# Patient Record
Sex: MALE | Race: WHITE | NOT HISPANIC OR LATINO | URBAN - METROPOLITAN AREA
[De-identification: names, ages, dates, MRNs, and addresses within clinical notes are randomized per-mention and may not be internally consistent; named-entity substitution may affect disease eponyms.]

---

## 2022-12-03 ENCOUNTER — EMERGENCY (EMERGENCY)
Facility: HOSPITAL | Age: 27
LOS: 1 days | Discharge: PSYCHIATRIC OR SUBSTANCE USE FACILITY | End: 2022-12-03
Attending: EMERGENCY MEDICINE
Payer: MEDICAID

## 2022-12-03 VITALS
HEART RATE: 110 BPM | OXYGEN SATURATION: 97 % | TEMPERATURE: 98 F | SYSTOLIC BLOOD PRESSURE: 132 MMHG | RESPIRATION RATE: 16 BRPM | DIASTOLIC BLOOD PRESSURE: 79 MMHG

## 2022-12-03 DIAGNOSIS — F31.4 BIPOLAR DISORDER, CURRENT EPISODE DEPRESSED, SEVERE, WITHOUT PSYCHOTIC FEATURES: ICD-10-CM

## 2022-12-03 LAB
ALBUMIN SERPL ELPH-MCNC: 4.9 G/DL — SIGNIFICANT CHANGE UP (ref 3.3–5)
ALP SERPL-CCNC: 81 U/L — SIGNIFICANT CHANGE UP (ref 40–120)
ALT FLD-CCNC: 40 U/L — SIGNIFICANT CHANGE UP (ref 10–45)
AMPHET UR-MCNC: POSITIVE
ANION GAP SERPL CALC-SCNC: 18 MMOL/L — HIGH (ref 5–17)
ANISOCYTOSIS BLD QL: SIGNIFICANT CHANGE UP
APAP SERPL-MCNC: <15 UG/ML — SIGNIFICANT CHANGE UP (ref 10–30)
APPEARANCE UR: CLEAR — SIGNIFICANT CHANGE UP
AST SERPL-CCNC: 34 U/L — SIGNIFICANT CHANGE UP (ref 10–40)
BACTERIA # UR AUTO: NEGATIVE — SIGNIFICANT CHANGE UP
BARBITURATES UR SCN-MCNC: NEGATIVE — SIGNIFICANT CHANGE UP
BASE EXCESS BLDV CALC-SCNC: 2.5 MMOL/L — SIGNIFICANT CHANGE UP (ref -2–3)
BASOPHILS # BLD AUTO: 0 K/UL — SIGNIFICANT CHANGE UP (ref 0–0.2)
BASOPHILS NFR BLD AUTO: 0 % — SIGNIFICANT CHANGE UP (ref 0–2)
BENZODIAZ UR-MCNC: NEGATIVE — SIGNIFICANT CHANGE UP
BILIRUB SERPL-MCNC: 0.5 MG/DL — SIGNIFICANT CHANGE UP (ref 0.2–1.2)
BILIRUB UR-MCNC: NEGATIVE — SIGNIFICANT CHANGE UP
BUN SERPL-MCNC: 15 MG/DL — SIGNIFICANT CHANGE UP (ref 7–23)
CA-I SERPL-SCNC: 1.16 MMOL/L — SIGNIFICANT CHANGE UP (ref 1.15–1.33)
CALCIUM SERPL-MCNC: 9.7 MG/DL — SIGNIFICANT CHANGE UP (ref 8.4–10.5)
CHLORIDE BLDV-SCNC: 100 MMOL/L — SIGNIFICANT CHANGE UP (ref 96–108)
CHLORIDE SERPL-SCNC: 101 MMOL/L — SIGNIFICANT CHANGE UP (ref 96–108)
CO2 BLDV-SCNC: 29 MMOL/L — HIGH (ref 22–26)
CO2 SERPL-SCNC: 19 MMOL/L — LOW (ref 22–31)
COCAINE METAB.OTHER UR-MCNC: NEGATIVE — SIGNIFICANT CHANGE UP
COLOR SPEC: YELLOW — SIGNIFICANT CHANGE UP
CREAT SERPL-MCNC: 0.9 MG/DL — SIGNIFICANT CHANGE UP (ref 0.5–1.3)
DACRYOCYTES BLD QL SMEAR: SLIGHT — SIGNIFICANT CHANGE UP
DIFF PNL FLD: NEGATIVE — SIGNIFICANT CHANGE UP
EGFR: 119 ML/MIN/1.73M2 — SIGNIFICANT CHANGE UP
EOSINOPHIL # BLD AUTO: 0 K/UL — SIGNIFICANT CHANGE UP (ref 0–0.5)
EOSINOPHIL NFR BLD AUTO: 0 % — SIGNIFICANT CHANGE UP (ref 0–6)
EPI CELLS # UR: 1 /HPF — SIGNIFICANT CHANGE UP
ETHANOL SERPL-MCNC: <10 MG/DL — SIGNIFICANT CHANGE UP (ref 0–10)
FLUAV AG NPH QL: SIGNIFICANT CHANGE UP
FLUBV AG NPH QL: SIGNIFICANT CHANGE UP
GAS PNL BLDV: 137 MMOL/L — SIGNIFICANT CHANGE UP (ref 136–145)
GAS PNL BLDV: SIGNIFICANT CHANGE UP
GAS PNL BLDV: SIGNIFICANT CHANGE UP
GLUCOSE BLDV-MCNC: 120 MG/DL — HIGH (ref 70–99)
GLUCOSE SERPL-MCNC: 120 MG/DL — HIGH (ref 70–99)
GLUCOSE UR QL: NEGATIVE — SIGNIFICANT CHANGE UP
HCO3 BLDV-SCNC: 28 MMOL/L — SIGNIFICANT CHANGE UP (ref 22–29)
HCT VFR BLD CALC: 45.6 % — SIGNIFICANT CHANGE UP (ref 39–50)
HCT VFR BLDA CALC: 42 % — SIGNIFICANT CHANGE UP (ref 39–51)
HGB BLD CALC-MCNC: 14 G/DL — SIGNIFICANT CHANGE UP (ref 12.6–17.4)
HGB BLD-MCNC: 14 G/DL — SIGNIFICANT CHANGE UP (ref 13–17)
HYALINE CASTS # UR AUTO: 5 /LPF — HIGH (ref 0–2)
HYPOCHROMIA BLD QL: SLIGHT — SIGNIFICANT CHANGE UP
KETONES UR-MCNC: NEGATIVE — SIGNIFICANT CHANGE UP
LACTATE BLDV-MCNC: 1.5 MMOL/L — SIGNIFICANT CHANGE UP (ref 0.5–2)
LDH SERPL L TO P-CCNC: 209 U/L — SIGNIFICANT CHANGE UP (ref 50–242)
LEUKOCYTE ESTERASE UR-ACNC: NEGATIVE — SIGNIFICANT CHANGE UP
LYMPHOCYTES # BLD AUTO: 0.92 K/UL — LOW (ref 1–3.3)
LYMPHOCYTES # BLD AUTO: 6.1 % — LOW (ref 13–44)
MANUAL SMEAR VERIFICATION: SIGNIFICANT CHANGE UP
MCHC RBC-ENTMCNC: 18.2 PG — LOW (ref 27–34)
MCHC RBC-ENTMCNC: 30.7 GM/DL — LOW (ref 32–36)
MCV RBC AUTO: 59.2 FL — LOW (ref 80–100)
METHADONE UR-MCNC: NEGATIVE — SIGNIFICANT CHANGE UP
MICROCYTES BLD QL: SIGNIFICANT CHANGE UP
MONOCYTES # BLD AUTO: 0.92 K/UL — HIGH (ref 0–0.9)
MONOCYTES NFR BLD AUTO: 6.1 % — SIGNIFICANT CHANGE UP (ref 2–14)
NEUTROPHILS # BLD AUTO: 13.31 K/UL — HIGH (ref 1.8–7.4)
NEUTROPHILS NFR BLD AUTO: 87.8 % — HIGH (ref 43–77)
NITRITE UR-MCNC: NEGATIVE — SIGNIFICANT CHANGE UP
OPIATES UR-MCNC: NEGATIVE — SIGNIFICANT CHANGE UP
OVALOCYTES BLD QL SMEAR: SIGNIFICANT CHANGE UP
OXYCODONE UR-MCNC: NEGATIVE — SIGNIFICANT CHANGE UP
PCO2 BLDV: 45 MMHG — SIGNIFICANT CHANGE UP (ref 42–55)
PCP SPEC-MCNC: SIGNIFICANT CHANGE UP
PCP UR-MCNC: NEGATIVE — SIGNIFICANT CHANGE UP
PH BLDV: 7.4 — SIGNIFICANT CHANGE UP (ref 7.32–7.43)
PH UR: 6.5 — SIGNIFICANT CHANGE UP (ref 5–8)
PLAT MORPH BLD: NORMAL — SIGNIFICANT CHANGE UP
PLATELET # BLD AUTO: 304 K/UL — SIGNIFICANT CHANGE UP (ref 150–400)
PO2 BLDV: 40 MMHG — SIGNIFICANT CHANGE UP (ref 25–45)
POIKILOCYTOSIS BLD QL AUTO: SIGNIFICANT CHANGE UP
POLYCHROMASIA BLD QL SMEAR: SLIGHT — SIGNIFICANT CHANGE UP
POTASSIUM BLDV-SCNC: 3.3 MMOL/L — LOW (ref 3.5–5.1)
POTASSIUM SERPL-MCNC: 3.6 MMOL/L — SIGNIFICANT CHANGE UP (ref 3.5–5.3)
POTASSIUM SERPL-SCNC: 3.6 MMOL/L — SIGNIFICANT CHANGE UP (ref 3.5–5.3)
PROT SERPL-MCNC: 8.2 G/DL — SIGNIFICANT CHANGE UP (ref 6–8.3)
PROT UR-MCNC: ABNORMAL
RBC # BLD: 7.48 M/UL — HIGH (ref 4.2–5.8)
RBC # BLD: 7.7 M/UL — HIGH (ref 4.2–5.8)
RBC # FLD: 18.1 % — HIGH (ref 10.3–14.5)
RBC BLD AUTO: ABNORMAL
RBC CASTS # UR COMP ASSIST: 1 /HPF — SIGNIFICANT CHANGE UP (ref 0–4)
RETICS #: 136.9 K/UL — HIGH (ref 25–125)
RETICS/RBC NFR: 1.8 % — SIGNIFICANT CHANGE UP (ref 0.5–2.5)
RSV RNA NPH QL NAA+NON-PROBE: SIGNIFICANT CHANGE UP
SALICYLATES SERPL-MCNC: <2 MG/DL — LOW (ref 15–30)
SAO2 % BLDV: 69.4 % — SIGNIFICANT CHANGE UP (ref 67–88)
SARS-COV-2 RNA SPEC QL NAA+PROBE: SIGNIFICANT CHANGE UP
SCHISTOCYTES BLD QL AUTO: SLIGHT — SIGNIFICANT CHANGE UP
SODIUM SERPL-SCNC: 138 MMOL/L — SIGNIFICANT CHANGE UP (ref 135–145)
SP GR SPEC: 1.03 — HIGH (ref 1.01–1.02)
THC UR QL: POSITIVE
UROBILINOGEN FLD QL: NEGATIVE — SIGNIFICANT CHANGE UP
WBC # BLD: 15.16 K/UL — HIGH (ref 3.8–10.5)
WBC # FLD AUTO: 15.16 K/UL — HIGH (ref 3.8–10.5)
WBC UR QL: 2 /HPF — SIGNIFICANT CHANGE UP (ref 0–5)

## 2022-12-03 PROCEDURE — 90792 PSYCH DIAG EVAL W/MED SRVCS: CPT | Mod: 95

## 2022-12-03 PROCEDURE — 99285 EMERGENCY DEPT VISIT HI MDM: CPT

## 2022-12-03 RX ORDER — ZIPRASIDONE HYDROCHLORIDE 20 MG/1
80 CAPSULE ORAL
Refills: 0 | Status: DISCONTINUED | OUTPATIENT
Start: 2022-12-03 | End: 2022-12-04

## 2022-12-03 RX ORDER — SERTRALINE 25 MG/1
150 TABLET, FILM COATED ORAL DAILY
Refills: 0 | Status: DISCONTINUED | OUTPATIENT
Start: 2022-12-03 | End: 2022-12-07

## 2022-12-03 RX ADMIN — Medication 0.5 MILLIGRAM(S): at 23:29

## 2022-12-03 RX ADMIN — SERTRALINE 150 MILLIGRAM(S): 25 TABLET, FILM COATED ORAL at 23:29

## 2022-12-03 NOTE — ED PROVIDER NOTE - PRINCIPAL DIAGNOSIS
62 y/o F   PMHx: HTN     Pt presents to ER with c/o nausea and TNTC episode of NBNB vomiting that began ~10PM. States leading up to that she was having diffuse abd cramping and 1 episode of diarrhea. [facility] [place]. Denies sick contacts or recently travel out of country. States she feels like she may have eaten something that made her sick- street food. States now she feels generally weak and dehydrated but is no longer endorsing abd cramping or severe nausea.
Suicidal ideation

## 2022-12-03 NOTE — ED BEHAVIORAL HEALTH NOTE - BEHAVIORAL HEALTH NOTE
========================     FOR EACH COLLATERAL     ========================     Collateral below has requested that the information provided remain confidential: Yes [  ] No [ X ]     Collateral below has provided information that patient is/may be unaware of: Yes [  ] No [ X ]     Patient gives permission to obtain collateral from _____:     ( X ) Yes     (  )  No     Rationale for overriding objection               ( X ) Lack of capacity. Details: [facility] attempted to call collateral and left a voicemail.                (  ) Assessing risk of danger to self/others. Details: ________     Rationale for selecting specific collateral source               (  ) Potential to impact risk of danger to self/others and no alternative equivalent. Details: _____     NAME: Radha      NUMBER: [phone]     RELATIONSHIP: Mother      RELIABILITY: Unreliable

## 2022-12-03 NOTE — ED BEHAVIORAL HEALTH ASSESSMENT NOTE - DESCRIPTION
SOURCE: RN and secondhand ED documentation      ARRIVAL: [facility]     BELONGINGS: All belongings were provided to hospital security, and patient currently in a gown with a 1:1 staff member.     BEHAVIOR: Per RN pt has been calm and cooperative. Reports pt stated he took an extra dose of his prescribed Ativan and Adderall, denies being a suicide attempt; however does report active SI. RN stated pt expressed having a plan, and would not disclose to RN. Pt presents AOX3, with fair grooming. Denies AVH/HI. RN reports hx of SIB, denies recent engagement.      TREATMENT: none required      VISITORS: No one at bedside Denies Lives with mother and father, one older sister, high school graduate, unemployed.

## 2022-12-03 NOTE — ED PROVIDER NOTE - CLINICAL SUMMARY MEDICAL DECISION MAKING FREE TEXT BOX
27 year old male with history of long standing MDD/suicidality, prior hospitalization hx, presenting with acute on chronic suicidality, active here with plan (does not want to divulge details) but overall calm and cooperative, VS wnl stable, no evidence of acute injury on exam, no gross psychosis. Will need 1:1, psych clearance labs, telepsych consult--anticipate voluntary/involuntary admission 27 year old male with history of long standing MDD/suicidality, prior hospitalization hx, presenting with acute on chronic suicidality, active here with plan (does not want to divulge details) but overall calm and cooperative, VS wnl stable, no evidence of acute injury on exam, no gross psychosis. Will need 1:1, psych clearance labs, telepsych consult--anticipate voluntary/involuntary admission  Attending Nancy Alonso: 28 yo male h/o h/o MDD presenting with increaesed depression and SI. placed on 1:1. on exam pt with dilated pupils. nonfocal neurologic exam. calm and cooperative. blood work sent with evidence of schistocytes, on review of old charts h/o similar in the past. pt denies any history. made aware of results. will d/w psych, will need to see hematology vs pcp for follow up

## 2022-12-03 NOTE — ED BEHAVIORAL HEALTH NOTE - BEHAVIORAL HEALTH NOTE
===================     PRE-HOSPITAL COURSE     ===================     SOURCE: RN and secondhand ED documentation      DETAILS:  PT was BIB EMS activated by mom after pt took an extra dose of prescribed Ativan and Adderall.      ============     ED COURSE     ============     SOURCE: RN and secondhand ED documentation      ARRIVAL: [facility]     BELONGINGS: All belongings were provided to hospital security, and patient currently in a gown with a 1:1 staff member.     BEHAVIOR: Per RN pt has been calm and cooperative. Reports pt stated he took an extra dose of his prescribed Ativan and Adderall, denies being a suicide attempt; however does report active SI. RN stated pt expressed having a plan, and would not disclose to RN. Pt presents AOX3, with fair grooming. Denies AVH/HI. RN reports hx of SIB, denies recent engagement.      TREATMENT: none required      VISITORS: No one at bedside          ------------------------------------------------     COVID Exposure Screen- collateral (i.e. third-party, chart review, belongings, etc; include EMS and ED staff)      ---------------------------------------------------     1.    Has the patient had a COVID-19 test in the last 90 days? Unknown.      2. Has the patient tested positive for COVID-19 antibodies? Unknown.      3.Has the patient received 2 doses of the COVID-19 vaccine?  Unknown.      4. In the past 10 days, has the patient been around anyone with a positive COVID-19 test?* Unknown.      5.Has the patient been out of [place] within the past 10 days? Unknown.

## 2022-12-03 NOTE — ED ADULT NURSE NOTE - ED STAT RN HANDOFF DETAILS 3
Report given to SALAS Franklin. Receiving RN aware of PT's 2PC missing paperwork. YVETTE Ferguson awaiting callback from on-call social work.
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
## 2022-12-03 NOTE — ED BEHAVIORAL HEALTH ASSESSMENT NOTE - ADDITIONAL DETAILS ALL
Patient reports suicidal ideation with plan, though refuses to share what that plan is. Reports suicide attempt by cutting two years ago.
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
## 2022-12-03 NOTE — ED ADULT NURSE NOTE - OBJECTIVE STATEMENT
28 yo male PMH depression, A&Ox3, BIBEMS for SI.  EMS reports pt admits to taking extra ativan/adderall and has expressed suicidal ideations.  pt has had stress at home with taking care of his father/and problems with his girlfriend. Pt admits he has a plan but would not disclose with ED staff.   Breathing even and unlabored, PERRL, abdomen soft nontender, old cutting scars noted to exts.  Pt denies chest pain, palpitations, shortness of breath, headache, visual disturbances, numbness/tingling, fever, chills, diaphoresis,  nausea, vomiting, constipation, diarrhea, or urinary symptoms.

## 2022-12-03 NOTE — ED PROVIDER NOTE - PHYSICAL EXAMINATION
Gen - NAD; well-appearing; A+Ox3   HEENT - NCAT, EOMI  Neck - supple  Resp - CTAB, no increased WOB  CV -  RRR, no m/r/g  Abd - soft, NT, ND; no guarding or rebound  MSK - FROM of b/l UE and LE, no gross deformities  Extrem - no LE edema/erythema/tenderness  Neuro - no focal motor or sensation deficits  Skin - warm, well perfused; chronic appearing/healed linear hyperpigmented scars over R shin and forearm  Psych - mood "stuck", linear thoughts, somewhat strange affect, +SI, no HI, no hallucinations, good insight Gen - NAD; well-appearing; A+Ox3   HEENT - NCAT, EOMI  Neck - supple  Resp - CTAB, no increased WOB  CV -  RRR, no m/r/g  Abd - soft, NT, ND; no guarding or rebound  MSK - FROM of b/l UE and LE, no gross deformities  Extrem - no LE edema/erythema/tenderness  Neuro - no focal motor or sensation deficits  Skin - warm, well perfused; chronic appearing/healed linear hyperpigmented scars over R shin and forearm  Psych - mood "stuck", linear thoughts, somewhat strange affect, +SI, no HI, no hallucinations, good insight  Attending Nancy Alonso: Gen: NAD, heent: atrauamtic, pupils dilated bl 7mm reactive, op pink, uvula midline, neck; nttp, no nuchal rigidity, chest: nttp, no crepitus, cv: rrr, no murmurs, lungs: ctab, abd: soft, nontender, nondistended, no peritoneal signs,  no guarding, ext: wwp, neg homans, skin: no rash, neuro: awake and alert, following commands, speech clear, sensation and strength intact, no focal deficits. psych: +SI

## 2022-12-03 NOTE — ED PROVIDER NOTE - ATTENDING CONTRIBUTION TO CARE
Attending MD Nancy Alonso:  I personally have seen and examined this patient.  Resident note reviewed and agree on plan of care and except where noted.  See HPI, PE, and MDM for details.

## 2022-12-03 NOTE — ED BEHAVIORAL HEALTH ASSESSMENT NOTE - SUMMARY
Patient is a 25-year-old man, unemployed, living with mother and father in [facility], single, childless; self-reported psychiatric history bipolar disorder, anxiety, ADHD, one prior hospitalization about two years ago, prior NSSIB (hair pulling); denies past medical history; brought in by EMS activated by patient’s mother for depression after patient reportedly took “extra” medication.  Patient’s history is notable for long-standing symptoms of depression, impulsivity, self-injurious behavior. Notably, patient’s depressive symptoms appear to have been exacerbated in the setting of recent stressors. He currently reports suicidal ideation with plan and is guarded about details. He additionally reports sending messages to people in his life saying that he is going to kill himself. Patient on exam is depressed, reports suicidal ideation with plan, is impulsive (pulled out hair from chest during session). Patient requires involuntary admission to psychiatry for danger to self. We were unable to obtain collateral.    Patient pending medical clearance

## 2022-12-03 NOTE — ED PROVIDER NOTE - OBJECTIVE STATEMENT
27-year-old male with history of depression and suicidality, 1 prior psych admission at [facility] in 2019? presenting with suicidal ideation.  Patient states that he has had recent stressors secondary to his father becoming quadriplegic due to strokes as well as his girlfriend not responding to him, today took an extra dose of Ativan 0.5 mg tablet at 3 PM as well as Adderall.  Has suicidal plan but does not wish to discuss at this time.  Has history of self-injurious behavior including cutting to right shin and right forearm. No significant etoh use but +tobacco/marijuana. Crisis hotline activated by mother today when she found out patient took extra ativan.    mom [phone] 27-year-old male with history of depression, anxiety, and suicidality, 1 prior psych admission at [facility] in 2019? presenting with suicidal ideation.  Patient states that he has had recent stressors secondary to his father becoming quadriplegic due to strokes as well as his girlfriend not responding to him, today took an extra dose of Ativan 0.5 mg tablet at 3 PM as well as Adderall.  Has suicidal plan but does not wish to discuss at this time.  Has history of self-injurious behavior including cutting to right shin and right forearm. No significant etoh use but +tobacco/marijuana. Crisis hotline activated by mother today when she found out patient took extra ativan.    mom [phone]  meds: remeron, zoloft, adderall, ativan

## 2022-12-03 NOTE — ED BEHAVIORAL HEALTH ASSESSMENT NOTE - RISK ASSESSMENT
Patient is at elevated risk of self harm given history of depression, bipolar disorder, suicidal ideation and prior suicide attempts, impulsivity, social stressors. Patient with few protective factors, though include residential stability, supportive family.

## 2022-12-03 NOTE — ED BEHAVIORAL HEALTH ASSESSMENT NOTE - NSBHMSEPERCEPT_PSY_A_CORE
Benefits, risks, and possible complications of procedure explained to patient/caregiver who verbalized understanding and gave verbal consent.
No abnormalities

## 2022-12-04 LAB — HAPTOGLOB SERPL-MCNC: 146 MG/DL — SIGNIFICANT CHANGE UP (ref 34–200)

## 2022-12-04 PROCEDURE — 70450 CT HEAD/BRAIN W/O DYE: CPT | Mod: 26,MA

## 2022-12-04 RX ORDER — ZIPRASIDONE HYDROCHLORIDE 20 MG/1
80 CAPSULE ORAL ONCE
Refills: 0 | Status: COMPLETED | OUTPATIENT
Start: 2022-12-04 | End: 2022-12-04

## 2022-12-04 RX ADMIN — ZIPRASIDONE HYDROCHLORIDE 80 MILLIGRAM(S): 20 CAPSULE ORAL at 00:35

## 2022-12-04 RX ADMIN — Medication 0.5 MILLIGRAM(S): at 17:16

## 2022-12-04 RX ADMIN — Medication 0.5 MILLIGRAM(S): at 21:36

## 2022-12-04 RX ADMIN — ZIPRASIDONE HYDROCHLORIDE 80 MILLIGRAM(S): 20 CAPSULE ORAL at 21:36

## 2022-12-04 RX ADMIN — SERTRALINE 150 MILLIGRAM(S): 25 TABLET, FILM COATED ORAL at 17:11
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
## 2022-12-04 NOTE — CHART NOTE - NSCHARTNOTEFT_GEN_A_CORE
Weekend   Social Work was referred to patient in the ED as patient is recommended for inpatient psych admission. Medical chart was reviewed. As per chart patient is a 27-year-old male with history of Depression, Anxiety, and suicidality, 1 prior psych admission at [facility] in 2019. Patient presents with suicidal ideation and is requiring involuntary inpatient psychiatry admission. LMSW contacted [facility], [facility], [place], [place], [place], [facility], [facility]; no beds available. LMSW faxed paperwork to [place] for possible admission tomorrow. LMSW met with patient at bedside to provide support. Patient verbalized concerns with obtaining a bed.  made aware.   Social Work will continue to be available.

## 2022-12-05 PROCEDURE — 99214 OFFICE O/P EST MOD 30 MIN: CPT

## 2022-12-05 RX ORDER — DIPHENHYDRAMINE HCL 50 MG
25 CAPSULE ORAL ONCE
Refills: 0 | Status: COMPLETED | OUTPATIENT
Start: 2022-12-05 | End: 2022-12-05

## 2022-12-05 RX ORDER — ZIPRASIDONE HYDROCHLORIDE 20 MG/1
80 CAPSULE ORAL ONCE
Refills: 0 | Status: COMPLETED | OUTPATIENT
Start: 2022-12-05 | End: 2022-12-05

## 2022-12-05 RX ADMIN — Medication 0.5 MILLIGRAM(S): at 19:34

## 2022-12-05 RX ADMIN — SERTRALINE 150 MILLIGRAM(S): 25 TABLET, FILM COATED ORAL at 11:57

## 2022-12-05 RX ADMIN — Medication 0.5 MILLIGRAM(S): at 07:55

## 2022-12-05 NOTE — PROGRESS NOTE BEHAVIORAL HEALTH - NSBHCHARTREVIEWLAB_PSY_A_CORE FT
14.0   15.16 )-----------( 304      ( 03 Dec 2022 19:24 )             45.6     12-03    138  |  101  |  15  ----------------------------<  120<H>  3.6   |  19<L>  |  0.90    Ca    9.7      03 Dec 2022 19:24    TPro  8.2  /  Alb  4.9  /  TBili  0.5  /  DBili  x   /  AST  34  /  ALT  40  /  AlkPhos  81  12-03    Urinalysis Basic - ( 03 Dec 2022 20:20 )    Color: Yellow / Appearance: Clear / S.026 / pH: x  Gluc: x / Ketone: Negative  / Bili: Negative / Urobili: Negative   Blood: x / Protein: 30 mg/dL / Nitrite: Negative   Leuk Esterase: Negative / RBC: 1 /hpf / WBC 2 /HPF   Sq Epi: x / Non Sq Epi: 1 /hpf / Bacteria: Negative

## 2022-12-05 NOTE — PROGRESS NOTE BEHAVIORAL HEALTH - RISK ASSESSMENT
Acutely elevated in s/o recent SA, and ongoing current active SI.  Requires psychiatric admission for safety and stabilization.

## 2022-12-05 NOTE — PROGRESS NOTE BEHAVIORAL HEALTH - NSBHCHARTREVIEWINVESTIGATE_PSY_A_CORE FT
< from: 12 Lead ECG (12.03.22 @ 21:37) >      Ventricular Rate 99 BPM    Atrial Rate 99 BPM    P-R Interval 124 ms    QRS Duration 80 ms    Q-T Interval 344 ms    QTC Calculation(Bazett) 441 ms    P Axis 43 degrees    R Axis 47 degrees    T Axis 15 degrees    Diagnosis Line NORMAL SINUS RHYTHM  NONSPECIFIC ST AND T WAVE ABNORMALITY  ABNORMAL ECG  NO PREVIOUS ECGS AVAILABLE  Confirmed by WILBERT MONTEJO MD ([id number]) on 12/4/2022 4:13:10 PM    < end of copied text >

## 2022-12-05 NOTE — ED BEHAVIORAL HEALTH NOTE - BEHAVIORAL HEALTH NOTE
Re-assessment Note     ================     SOURCE:  Secondhand ED documentation.     BEHAVIOR Per chart, earlier in the day pt became agitated after a phone call with his mother. Pt began banging his head against the wall; evaluated and no visible injuries noted. Since the incident pt has been calm, cooperative and in behavioral control. Pt has been resting comfortably.     TREATMENT:  Pt was given Ativan .5mg, Zoloft 150 mg and Geodon 80 mg.

## 2022-12-05 NOTE — PROGRESS NOTE BEHAVIORAL HEALTH - NSBHCONSULTMEDS_PSY_A_CORE FT
Increase Geodon to 40mg PO daily and 80mg PO qHS    C/w home dose Ativan, Zoloft    Add Seroquel 100mg PO qhS PRN insomnia    monitor EKG for QTc<500    C-L psych will continue to follow    2PC to inpt psych once bed found and pt medically cleared

## 2022-12-05 NOTE — PROGRESS NOTE BEHAVIORAL HEALTH - NSBHCHARTREVIEWVS_PSY_A_CORE FT
T(C): 36.8 (12-05-22 @ 06:15), Max: 37.1 (12-04-22 @ 19:00)  HR: 76 (12-05-22 @ 12:42) (76 - 110)  BP: 125/86 (12-05-22 @ 12:42) (119/83 - 157/93)  RR: 18 (12-05-22 @ 12:42) (16 - 18)  SpO2: 99% (12-05-22 @ 12:42) (96% - 99%)  Wt(kg): --
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
## 2022-12-05 NOTE — PROGRESS NOTE BEHAVIORAL HEALTH - SUMMARY
Patient is a 25-year-old man, unemployed, living with mother and father in [facility], single, childless; self-reported psychiatric history bipolar disorder, anxiety, ADHD, one prior hospitalization about two years ago, prior NSSIB (hair pulling); denies past medical history; brought in by EMS activated by patient’s mother for depression after patient reportedly took “extra” medication.  Patient’s history is notable for long-standing symptoms of depression, impulsivity, self-injurious behavior. Notably, patient’s depressive symptoms appear to have been exacerbated in the setting of recent stressors. He currently reports suicidal ideation with plan and is guarded about details. He additionally reports sending messages to people in his life saying that he is going to kill himself. Patient on exam is depressed, reports suicidal ideation with plan, is impulsive (pulled out hair from chest during session). Patient requires involuntary admission to psychiatry for danger to self once medically cleared and bed identified.

## 2022-12-06 VITALS
TEMPERATURE: 98 F | OXYGEN SATURATION: 98 % | HEART RATE: 95 BPM | SYSTOLIC BLOOD PRESSURE: 128 MMHG | RESPIRATION RATE: 18 BRPM | DIASTOLIC BLOOD PRESSURE: 90 MMHG

## 2022-12-06 PROCEDURE — 82565 ASSAY OF CREATININE: CPT

## 2022-12-06 PROCEDURE — 85014 HEMATOCRIT: CPT

## 2022-12-06 PROCEDURE — 99285 EMERGENCY DEPT VISIT HI MDM: CPT | Mod: 25

## 2022-12-06 PROCEDURE — 82435 ASSAY OF BLOOD CHLORIDE: CPT

## 2022-12-06 PROCEDURE — 70450 CT HEAD/BRAIN W/O DYE: CPT | Mod: MA

## 2022-12-06 PROCEDURE — 83010 ASSAY OF HAPTOGLOBIN QUANT: CPT

## 2022-12-06 PROCEDURE — 85045 AUTOMATED RETICULOCYTE COUNT: CPT

## 2022-12-06 PROCEDURE — 82803 BLOOD GASES ANY COMBINATION: CPT

## 2022-12-06 PROCEDURE — 83605 ASSAY OF LACTIC ACID: CPT

## 2022-12-06 PROCEDURE — 82330 ASSAY OF CALCIUM: CPT

## 2022-12-06 PROCEDURE — 80307 DRUG TEST PRSMV CHEM ANLYZR: CPT

## 2022-12-06 PROCEDURE — 99214 OFFICE O/P EST MOD 30 MIN: CPT

## 2022-12-06 PROCEDURE — 80053 COMPREHEN METABOLIC PANEL: CPT

## 2022-12-06 PROCEDURE — 87637 SARSCOV2&INF A&B&RSV AMP PRB: CPT

## 2022-12-06 PROCEDURE — 83615 LACTATE (LD) (LDH) ENZYME: CPT

## 2022-12-06 PROCEDURE — 93005 ELECTROCARDIOGRAM TRACING: CPT

## 2022-12-06 PROCEDURE — 85025 COMPLETE CBC W/AUTO DIFF WBC: CPT

## 2022-12-06 PROCEDURE — 36415 COLL VENOUS BLD VENIPUNCTURE: CPT

## 2022-12-06 PROCEDURE — 85018 HEMOGLOBIN: CPT

## 2022-12-06 PROCEDURE — 82947 ASSAY GLUCOSE BLOOD QUANT: CPT

## 2022-12-06 PROCEDURE — 84132 ASSAY OF SERUM POTASSIUM: CPT

## 2022-12-06 PROCEDURE — 84295 ASSAY OF SERUM SODIUM: CPT

## 2022-12-06 PROCEDURE — 81001 URINALYSIS AUTO W/SCOPE: CPT

## 2022-12-06 RX ORDER — QUETIAPINE FUMARATE 200 MG/1
100 TABLET, FILM COATED ORAL ONCE
Refills: 0 | Status: COMPLETED | OUTPATIENT
Start: 2022-12-06 | End: 2022-12-06

## 2022-12-06 RX ORDER — QUETIAPINE FUMARATE 200 MG/1
100 TABLET, FILM COATED ORAL ONCE
Refills: 0 | Status: DISCONTINUED | OUTPATIENT
Start: 2022-12-06 | End: 2022-12-06

## 2022-12-06 RX ORDER — ZIPRASIDONE HYDROCHLORIDE 20 MG/1
40 CAPSULE ORAL
Refills: 0 | Status: DISCONTINUED | OUTPATIENT
Start: 2022-12-06 | End: 2022-12-07

## 2022-12-06 RX ORDER — ZIPRASIDONE HYDROCHLORIDE 20 MG/1
80 CAPSULE ORAL
Refills: 0 | Status: DISCONTINUED | OUTPATIENT
Start: 2022-12-06 | End: 2022-12-07

## 2022-12-06 RX ADMIN — Medication 25 MILLIGRAM(S): at 00:01

## 2022-12-06 RX ADMIN — Medication 0.5 MILLIGRAM(S): at 06:01

## 2022-12-06 RX ADMIN — Medication 0.5 MILLIGRAM(S): at 00:01

## 2022-12-06 RX ADMIN — SERTRALINE 150 MILLIGRAM(S): 25 TABLET, FILM COATED ORAL at 12:40

## 2022-12-06 RX ADMIN — QUETIAPINE FUMARATE 100 MILLIGRAM(S): 200 TABLET, FILM COATED ORAL at 04:38

## 2022-12-06 RX ADMIN — ZIPRASIDONE HYDROCHLORIDE 80 MILLIGRAM(S): 20 CAPSULE ORAL at 00:01

## 2022-12-06 RX ADMIN — ZIPRASIDONE HYDROCHLORIDE 40 MILLIGRAM(S): 20 CAPSULE ORAL at 08:14

## 2022-12-06 NOTE — BH CONSULTATION LIAISON PROGRESS NOTE - NSBHFUPINTERVALHXFT_PSY_A_CORE
Pt remains tearful, depressed, somnolent, states he has been in the ER hallway waiting for a bed for a long time, states he is losing hope.  Has ongoing passive SI, but no specific plans for harm, states he would not want to inconvenience anyone.  Denies AVH/paranoia.  Asking if he can take a shower.  Overnight pt with episode of agitation/irritability, received Seroquel PRN.

## 2022-12-06 NOTE — ED BEHAVIORAL HEALTH NOTE - BEHAVIORAL HEALTH NOTE
Per RN patient remains gowned/wanded on 1:1 in hallway area. Patient presenting as irritable he is in hallway; triggered by environment. No SI/HI/AH/VH elicited. Received Seroquel 100mg, Benadryl 50mg, and .5mg Ativan PO overnight. Observed to be awake/resting in stretcher. Patient pending psychiatric bed placement/admission.

## 2022-12-06 NOTE — BH CONSULTATION LIAISON PROGRESS NOTE - CURRENT MEDICATION
MEDICATIONS  (STANDING):  LORazepam     Tablet 0.5 milliGRAM(s) Oral every 12 hours  sertraline 150 milliGRAM(s) Oral daily  ziprasidone 40 milliGRAM(s) Oral <User Schedule>  ziprasidone 80 milliGRAM(s) Oral <User Schedule>    MEDICATIONS  (PRN):

## 2022-12-06 NOTE — BH CONSULTATION LIAISON PROGRESS NOTE - NSBHCONSULTRECOMMENDOTHER_PSY_A_CORE FT
Increase Geodon to 40mg PO daily and 80mg PO qHS    C/w home dose Ativan, Zoloft    PRN Seroquel 100mg PO qhS PRN insomnia    monitor EKG for QTc<500    C-L psych will continue to follow    2PC to inpt psych once bed found and pt medically cleared

## 2022-12-06 NOTE — BH CONSULTATION LIAISON PROGRESS NOTE - NSBHCHARTREVIEWINVESTIGATE_PSY_A_CORE FT
< from: 12 Lead ECG (12.03.22 @ 21:37) >      Ventricular Rate 99 BPM    Atrial Rate 99 BPM    P-R Interval 124 ms    QRS Duration 80 ms    Q-T Interval 344 ms    QTC Calculation(Bazett) 441 ms    P Axis 43 degrees    R Axis 47 degrees    T Axis 15 degrees    Diagnosis Line NORMAL SINUS RHYTHM  NONSPECIFIC ST AND T WAVE ABNORMALITY  ABNORMAL ECG  NO PREVIOUS ECGS AVAILABLE  Confirmed by WILBERT MONTEJO MD ([id number]) on 12/4/2022 4:13:10 PM    < end of copied text >
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
## 2022-12-06 NOTE — ED ADULT NURSE REASSESSMENT NOTE - GENERAL PATIENT STATE
comfortable appearance/resting/sleeping
comfortable appearance
comfortable appearance/resting/sleeping
resting/sleeping
anxious
comfortable appearance

## 2022-12-06 NOTE — BH CONSULTATION LIAISON PROGRESS NOTE - NSBHCHARTREVIEWVS_PSY_A_CORE FT
Vital Signs Last 24 Hrs  T(C): 36.8 (06 Dec 2022 11:23), Max: 36.8 (06 Dec 2022 00:05)  T(F): 98.2 (06 Dec 2022 11:23), Max: 98.2 (06 Dec 2022 00:05)  HR: 81 (06 Dec 2022 11:23) (69 - 106)  BP: 117/77 (06 Dec 2022 11:23) (112/71 - 131/79)  BP(mean): 85 (06 Dec 2022 04:35) (85 - 93)  RR: 18 (06 Dec 2022 11:23) (18 - 20)  SpO2: 97% (06 Dec 2022 11:23) (95% - 99%)    Parameters below as of 06 Dec 2022 11:23  Patient On (Oxygen Delivery Method): room air
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
## 2022-12-07 NOTE — ED POST DISCHARGE NOTE - RESULT SUMMARY
Cecilia Isabel, Supervisor from [facility] called, trying to find where pt was transferred to. Chart reviewed, informed her transfer was made to [facility]. - Yovani Lee PA-C Cecilia Isabel, Supervisor from [facility] (pt's outpatient psych facility) called, trying to find where pt was transferred to. Chart reviewed, informed her transfer was made to [facility]. - Yovani Lee PA-C Cecilia Isabel, Supervisor from [facility] (pt's outpatient psych facility) called, trying to find where pt was transferred to. Chart reviewed, informed her transfer was made to [facility]. - DEE VarelaC

## 2023-03-31 NOTE — ED PROVIDER NOTE - PROGRESS NOTE DETAILS
English Gal, PGY-3  Patient signed out to me. 27-year-old male with history of depression, anxiety, and suicidality, 1 prior psych admission at [facility] in 2019? presenting with suicidal ideation. Calm and cooperative on reexamination. Tele psych consulted and awaiting recs. On 1;1. Gal, PGY-3  Schistocytes seen on CBC. LDH sent and wnl. Chart review shows hx of schistocytes. Medically cleared. Psych reports that patient will require involuntary admission. Home meds ordered. Jocy attg: While seeing another patient received phone call from charge desk.  Patient had been on the phone and became upset with phone conversation.  Returned back to [place] [id number] and proceeded to hit his head against the wall.  Patient awake alert ambulating.  Will obtain CT head to evaluate. Jocy attg: While seeing another patient received phone call from charge desk.  Patient had been on the phone and became upset with phone conversation.  Returned back to [place] [id number] and proceeded to hit his head against the wall.  Patient awake, alert and tearful on exam. ambulatory, apologizing for his actions. denies any pain to the anterior forehead. no erythema or edema of the forehead. will obtain head CT. pt pending bed placement. ap- pt was signed out to me pending admission, pt resting in bed comfortably reports he had an ekg today, unable to find, pt states he takes his ziprazidone at 830 PM, will redose the med at that time. Mike, PGY3: Patient received as sign out, initially presented with si. Pending involuntary admission. Resting comfortably. Attending Nancy Alonso: pt 2pc awaitingbed. no bed this evening per social work ap- pt reassessed he is currently tearful, will give home geodon, ativan and benadryl, pt requesting medication to help him sleep, continues to be 2PC Attending note ([place]): patient endorsed to me at signout; awaiting bed availability (2PC / involuntary psychiatric admission for safety/decompensated psychiatric condition). Ordered Geodon 40mg for morning dose (standing) as per Psych recommendations; patient assessed, is resting comfortably in stretcher and reports he has no complaints. Will continue to monitor.

## 2025-07-14 NOTE — PROGRESS NOTE BEHAVIORAL HEALTH - ESTIMATED INTELLIGENCE
[No Acute Distress] : no acute distress [Normal Oropharynx] : normal oropharynx [Normal Appearance] : normal appearance [No Neck Mass] : no neck mass [Normal Rate/Rhythm] : normal rate/rhythm [Normal S1, S2] : normal s1, s2 [No Murmurs] : no murmurs [No Abnormalities] : no abnormalities [Benign] : benign [Normal Gait] : normal gait [No Clubbing] : no clubbing [No Cyanosis] : no cyanosis [No Edema] : no edema [FROM] : FROM [Normal Color/ Pigmentation] : normal color/ pigmentation [No Focal Deficits] : no focal deficits [Oriented x3] : oriented x3 [Normal Affect] : normal affect [TextBox_68] : Rhonchi at right base Average